# Patient Record
Sex: MALE | Race: OTHER | HISPANIC OR LATINO | URBAN - METROPOLITAN AREA
[De-identification: names, ages, dates, MRNs, and addresses within clinical notes are randomized per-mention and may not be internally consistent; named-entity substitution may affect disease eponyms.]

---

## 2017-02-02 ENCOUNTER — OUTPATIENT (OUTPATIENT)
Dept: OUTPATIENT SERVICES | Facility: HOSPITAL | Age: 47
LOS: 1 days | End: 2017-02-02
Payer: COMMERCIAL

## 2017-02-02 VITALS
WEIGHT: 190.04 LBS | DIASTOLIC BLOOD PRESSURE: 70 MMHG | TEMPERATURE: 98 F | HEART RATE: 70 BPM | HEIGHT: 68 IN | SYSTOLIC BLOOD PRESSURE: 110 MMHG

## 2017-02-02 DIAGNOSIS — M25.512 PAIN IN LEFT SHOULDER: ICD-10-CM

## 2017-02-02 DIAGNOSIS — Z01.818 ENCOUNTER FOR OTHER PREPROCEDURAL EXAMINATION: ICD-10-CM

## 2017-02-02 DIAGNOSIS — M75.100 UNSPECIFIED ROTATOR CUFF TEAR OR RUPTURE OF UNSPECIFIED SHOULDER, NOT SPECIFIED AS TRAUMATIC: ICD-10-CM

## 2017-02-02 LAB
ANION GAP SERPL CALC-SCNC: 5 MMOL/L — SIGNIFICANT CHANGE UP (ref 5–17)
BUN SERPL-MCNC: 15 MG/DL — SIGNIFICANT CHANGE UP (ref 7–23)
CALCIUM SERPL-MCNC: 9 MG/DL — SIGNIFICANT CHANGE UP (ref 8.4–10.5)
CHLORIDE SERPL-SCNC: 101 MMOL/L — SIGNIFICANT CHANGE UP (ref 96–108)
CO2 SERPL-SCNC: 30 MMOL/L — SIGNIFICANT CHANGE UP (ref 22–31)
CREAT SERPL-MCNC: 1.3 MG/DL — SIGNIFICANT CHANGE UP (ref 0.5–1.3)
GLUCOSE SERPL-MCNC: 107 MG/DL — HIGH (ref 70–99)
HCT VFR BLD CALC: 44.9 % — SIGNIFICANT CHANGE UP (ref 39–50)
HGB BLD-MCNC: 14.8 G/DL — SIGNIFICANT CHANGE UP (ref 13–17)
MCHC RBC-ENTMCNC: 28.4 PG — SIGNIFICANT CHANGE UP (ref 27–34)
MCHC RBC-ENTMCNC: 32.9 GM/DL — SIGNIFICANT CHANGE UP (ref 32–36)
MCV RBC AUTO: 86.1 FL — SIGNIFICANT CHANGE UP (ref 80–100)
PLATELET # BLD AUTO: 172 K/UL — SIGNIFICANT CHANGE UP (ref 150–400)
POTASSIUM SERPL-MCNC: 4.5 MMOL/L — SIGNIFICANT CHANGE UP (ref 3.5–5.3)
POTASSIUM SERPL-SCNC: 4.5 MMOL/L — SIGNIFICANT CHANGE UP (ref 3.5–5.3)
RBC # BLD: 5.21 M/UL — SIGNIFICANT CHANGE UP (ref 4.2–5.8)
RBC # FLD: 11.7 % — SIGNIFICANT CHANGE UP (ref 10.3–14.5)
SODIUM SERPL-SCNC: 136 MMOL/L — SIGNIFICANT CHANGE UP (ref 135–145)
WBC # BLD: 4.9 K/UL — SIGNIFICANT CHANGE UP (ref 3.8–10.5)
WBC # FLD AUTO: 4.9 K/UL — SIGNIFICANT CHANGE UP (ref 3.8–10.5)

## 2017-02-02 PROCEDURE — 93010 ELECTROCARDIOGRAM REPORT: CPT

## 2017-02-02 NOTE — H&P PST ADULT - PMH
Alcoholic cardiomyopathy    Congestive heart failure  2011 s/p ICU admission x 16 days  Depression    ETOH abuse  sober since 2011  HTN (hypertension) Congestive heart failure  2011 s/p ICU admission x 16 days  Depression    ETOH abuse  sober since 2011  HTN (hypertension) Cardiomyopathy, nonischemic    Depression    ETOH abuse  sober since 2011  HTN (hypertension)

## 2017-02-02 NOTE — H&P PST ADULT - CARDIOVASCULAR COMMENTS
Htn, alcoholic cardiomyopathy Htn, CHF HTN , non ischemic cardiomyopathy  EF 2016 40 % . Repeat echo scheduled , pending

## 2017-02-02 NOTE — H&P PST ADULT - NSANTHOSAYNRD_GEN_A_CORE
No. REMEDIOS screening performed.  STOP BANG Legend: 0-2 = LOW Risk; 3-4 = INTERMEDIATE Risk; 5-8 = HIGH Risk
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
## 2017-02-02 NOTE — H&P PST ADULT - PROBLEM SELECTOR PLAN 1
Left shoulder arthroscopy   Medical clearance   Pre op instruction Left shoulder arthroscopy   Medical clearance   Will obtain stress and echo report from cardiologist   Pre op instruction Left shoulder arthroscopy   Medical clearance   Will obtain  recent echo report from cardiologist   Pre op instruction

## 2017-02-17 RX ORDER — CEFAZOLIN SODIUM 1 G
2000 VIAL (EA) INJECTION ONCE
Qty: 0 | Refills: 0 | Status: COMPLETED | OUTPATIENT
Start: 2017-02-28 | End: 2017-02-28

## 2017-02-17 NOTE — ASU DISCHARGE PLAN (ADULT/PEDIATRIC). - MEDICATION SUMMARY - MEDICATIONS TO TAKE
I will START or STAY ON the medications listed below when I get home from the hospital:    acetaminophen-oxyCODONE 325 mg-10 mg oral tablet  -- 1 tab(s) by mouth every 4 hours, As Needed MDD:6  -- Caution federal law prohibits the transfer of this drug to any person other  than the person for whom it was prescribed.  May cause drowsiness.  Alcohol may intensify this effect.  Use care when operating dangerous machinery.  This prescription cannot be refilled.  This product contains acetaminophen.  Do not use  with any other product containing acetaminophen to prevent possible liver damage.  Using more of this medication than prescribed may cause serious breathing problems.    -- Indication: For moderate pain    enalapril 10 mg oral tablet  -- 1 tab(s) by mouth once a day  -- Indication: For Blood pressure    mirtazapine 45 mg oral tablet  -- 1 tab(s) by mouth once a day (at bedtime)  -- Indication: For mood    busPIRone 15 mg oral tablet  --  by mouth 4 times a day  -- Indication: For mood    carvedilol 25 mg oral tablet  -- 1 tab(s) by mouth 2 times a day  -- Indication: For Blood pressure    baclofen 20 mg oral tablet  --  by mouth once a day  -- Indication: For muscle relaxer

## 2017-02-17 NOTE — ASU DISCHARGE PLAN (ADULT/PEDIATRIC). - NOTIFY
Numbness, tingling/Excessive Diarrhea/Inability to Tolerate Liquids or Foods/Numbness, color, or temperature change to extremity/Persistent Nausea and Vomiting/Fever greater than 101/Unable to Urinate/Pain not relieved by Medications/Increased Irritability or Sluggishness/Swelling that continues/Bleeding that does not stop

## 2017-02-17 NOTE — ASU DISCHARGE PLAN (ADULT/PEDIATRIC). - FOLLOWUP APPOINTMENT CLINIC/PHYSICIAN
Please call Dr. TRAE Crump's office ([phone]) to schedule a follow-up appointment to be seen in 1 week.

## 2017-02-17 NOTE — ASU DISCHARGE PLAN (ADULT/PEDIATRIC). - ACTIVITY LEVEL
no heavy lifting/no exercise/no sports/gym/elevate extremity no tub baths/no heavy lifting/no exercise/elevate extremity/no sports/gym

## 2017-02-17 NOTE — ASU DISCHARGE PLAN (ADULT/PEDIATRIC). - INSTRUCTIONS
REST! Apply ice packs to incision sites 20 mins on, 20 mins off every 4 hours for first 24 hours.  If taking narcotic pain medications, may take COLACE (OTC stool softener) as directed to prevent constipation.

## 2017-02-27 ENCOUNTER — RESULT REVIEW (OUTPATIENT)
Age: 47
End: 2017-02-27

## 2017-02-28 ENCOUNTER — TRANSCRIPTION ENCOUNTER (OUTPATIENT)
Age: 47
End: 2017-02-28

## 2017-02-28 ENCOUNTER — OUTPATIENT (OUTPATIENT)
Dept: OUTPATIENT SERVICES | Facility: HOSPITAL | Age: 47
LOS: 1 days | End: 2017-02-28
Payer: COMMERCIAL

## 2017-02-28 VITALS
HEIGHT: 68 IN | RESPIRATION RATE: 12 BRPM | SYSTOLIC BLOOD PRESSURE: 110 MMHG | TEMPERATURE: 98 F | WEIGHT: 188.72 LBS | HEART RATE: 67 BPM | OXYGEN SATURATION: 98 % | DIASTOLIC BLOOD PRESSURE: 66 MMHG

## 2017-02-28 VITALS
RESPIRATION RATE: 15 BRPM | DIASTOLIC BLOOD PRESSURE: 76 MMHG | HEART RATE: 74 BPM | SYSTOLIC BLOOD PRESSURE: 110 MMHG | OXYGEN SATURATION: 95 %

## 2017-02-28 DIAGNOSIS — M75.100 UNSPECIFIED ROTATOR CUFF TEAR OR RUPTURE OF UNSPECIFIED SHOULDER, NOT SPECIFIED AS TRAUMATIC: ICD-10-CM

## 2017-02-28 DIAGNOSIS — L72.9 FOLLICULAR CYST OF THE SKIN AND SUBCUTANEOUS TISSUE, UNSPECIFIED: Chronic | ICD-10-CM

## 2017-02-28 PROCEDURE — 88304 TISSUE EXAM BY PATHOLOGIST: CPT | Mod: 26

## 2017-02-28 RX ORDER — SODIUM CHLORIDE 9 MG/ML
1000 INJECTION, SOLUTION INTRAVENOUS
Qty: 0 | Refills: 0 | Status: DISCONTINUED | OUTPATIENT
Start: 2017-02-28 | End: 2017-02-28

## 2017-02-28 RX ORDER — HYDROMORPHONE HYDROCHLORIDE 2 MG/ML
0.5 INJECTION INTRAMUSCULAR; INTRAVENOUS; SUBCUTANEOUS
Qty: 0 | Refills: 0 | Status: DISCONTINUED | OUTPATIENT
Start: 2017-02-28 | End: 2017-02-28

## 2017-02-28 RX ADMIN — SODIUM CHLORIDE 75 MILLILITER(S): 9 INJECTION, SOLUTION INTRAVENOUS at 11:15

## 2017-05-17 ENCOUNTER — APPOINTMENT (OUTPATIENT)
Dept: GASTROENTEROLOGY | Facility: CLINIC | Age: 47
End: 2017-05-17

## 2017-05-17 VITALS
WEIGHT: 185 LBS | OXYGEN SATURATION: 98 % | RESPIRATION RATE: 16 BRPM | HEART RATE: 73 BPM | HEIGHT: 68 IN | BODY MASS INDEX: 28.04 KG/M2 | SYSTOLIC BLOOD PRESSURE: 121 MMHG | DIASTOLIC BLOOD PRESSURE: 82 MMHG

## 2017-05-17 DIAGNOSIS — Z86.59 PERSONAL HISTORY OF OTHER MENTAL AND BEHAVIORAL DISORDERS: ICD-10-CM

## 2017-05-17 DIAGNOSIS — M75.120 COMPLETE ROTATOR CUFF TEAR OR RUPTURE OF UNSPECIFIED SHOULDER, NOT SPECIFIED AS TRAUMATIC: ICD-10-CM

## 2017-05-17 DIAGNOSIS — Z86.79 PERSONAL HISTORY OF OTHER DISEASES OF THE CIRCULATORY SYSTEM: ICD-10-CM

## 2017-05-17 DIAGNOSIS — R93.2 ABNORMAL FINDINGS ON DIAGNOSTIC IMAGING OF LIVER AND BILIARY TRACT: ICD-10-CM

## 2017-05-17 DIAGNOSIS — R10.11 RIGHT UPPER QUADRANT PAIN: ICD-10-CM

## 2018-01-08 PROBLEM — Z00.00 ENCOUNTER FOR PREVENTIVE HEALTH EXAMINATION: Noted: 2018-01-08

## 2018-01-10 ENCOUNTER — RECORD ABSTRACTING (OUTPATIENT)
Age: 48
End: 2018-01-10

## 2018-01-10 DIAGNOSIS — B19.10 UNSPECIFIED VIRAL HEPATITIS B W/OUT HEPATIC COMA: ICD-10-CM

## 2018-01-10 DIAGNOSIS — K76.89 OTHER SPECIFIED DISEASES OF LIVER: ICD-10-CM

## 2018-01-10 DIAGNOSIS — Z78.9 OTHER SPECIFIED HEALTH STATUS: ICD-10-CM

## 2018-01-10 DIAGNOSIS — K21.9 GASTRO-ESOPHAGEAL REFLUX DISEASE W/OUT ESOPHAGITIS: ICD-10-CM

## 2018-01-10 DIAGNOSIS — F41.9 ANXIETY DISORDER, UNSPECIFIED: ICD-10-CM

## 2018-02-07 ENCOUNTER — APPOINTMENT (OUTPATIENT)
Dept: CARDIOLOGY | Facility: CLINIC | Age: 48
End: 2018-02-07
Payer: COMMERCIAL

## 2018-02-07 VITALS
RESPIRATION RATE: 18 BRPM | WEIGHT: 186 LBS | HEART RATE: 72 BPM | DIASTOLIC BLOOD PRESSURE: 68 MMHG | HEIGHT: 68 IN | SYSTOLIC BLOOD PRESSURE: 110 MMHG | BODY MASS INDEX: 28.19 KG/M2

## 2018-02-07 DIAGNOSIS — F32.9 MAJOR DEPRESSIVE DISORDER, SINGLE EPISODE, UNSPECIFIED: ICD-10-CM

## 2018-02-07 PROCEDURE — 99214 OFFICE O/P EST MOD 30 MIN: CPT

## 2018-02-07 PROCEDURE — 93000 ELECTROCARDIOGRAM COMPLETE: CPT

## 2018-02-07 RX ORDER — CYCLOBENZAPRINE HYDROCHLORIDE 7.5 MG/1
TABLET, FILM COATED ORAL
Refills: 0 | Status: DISCONTINUED | COMMUNITY
End: 2018-02-07

## 2018-03-30 RX ORDER — BACLOFEN 100 %
0 POWDER (GRAM) MISCELLANEOUS
Qty: 0 | Refills: 0 | COMMUNITY

## 2018-03-30 RX ORDER — CARVEDILOL PHOSPHATE 80 MG/1
1 CAPSULE, EXTENDED RELEASE ORAL
Qty: 0 | Refills: 0 | COMMUNITY

## 2018-03-30 RX ORDER — MIRTAZAPINE 45 MG/1
1 TABLET, ORALLY DISINTEGRATING ORAL
Qty: 0 | Refills: 0 | COMMUNITY

## 2018-05-07 ENCOUNTER — APPOINTMENT (OUTPATIENT)
Dept: CARDIOLOGY | Facility: CLINIC | Age: 48
End: 2018-05-07
Payer: COMMERCIAL

## 2018-05-07 PROCEDURE — 93306 TTE W/DOPPLER COMPLETE: CPT

## 2018-05-15 ENCOUNTER — APPOINTMENT (OUTPATIENT)
Dept: CARDIOLOGY | Facility: CLINIC | Age: 48
End: 2018-05-15
Payer: MEDICAID

## 2018-05-15 VITALS
BODY MASS INDEX: 28.19 KG/M2 | WEIGHT: 186 LBS | HEIGHT: 68 IN | SYSTOLIC BLOOD PRESSURE: 113 MMHG | DIASTOLIC BLOOD PRESSURE: 80 MMHG | HEART RATE: 70 BPM | RESPIRATION RATE: 16 BRPM

## 2018-05-15 DIAGNOSIS — Z78.9 OTHER SPECIFIED HEALTH STATUS: ICD-10-CM

## 2018-05-15 PROCEDURE — 99214 OFFICE O/P EST MOD 30 MIN: CPT

## 2018-05-15 RX ORDER — BUSPIRONE HCL 5 MG
TABLET ORAL
Refills: 0 | Status: DISCONTINUED | COMMUNITY
End: 2018-05-15

## 2018-05-15 RX ORDER — ENALAPRIL MALEATE 10 MG/1
10 TABLET ORAL
Refills: 0 | Status: DISCONTINUED | COMMUNITY
End: 2018-05-15

## 2018-05-15 RX ORDER — MIRTAZAPINE 7.5 MG/1
TABLET, FILM COATED ORAL
Refills: 0 | Status: DISCONTINUED | COMMUNITY
End: 2018-05-15

## 2018-05-15 RX ORDER — BUPROPION HYDROCHLORIDE 75 MG/1
TABLET, FILM COATED ORAL
Refills: 0 | Status: DISCONTINUED | COMMUNITY
End: 2018-05-15

## 2018-05-15 RX ORDER — CARVEDILOL 25 MG/1
25 TABLET, FILM COATED ORAL
Refills: 0 | Status: DISCONTINUED | COMMUNITY
End: 2018-05-15

## 2018-07-16 PROBLEM — F10.10 ALCOHOL ABUSE, UNCOMPLICATED: Chronic | Status: ACTIVE | Noted: 2017-02-02

## 2018-10-29 ENCOUNTER — APPOINTMENT (OUTPATIENT)
Dept: CARDIOLOGY | Facility: CLINIC | Age: 48
End: 2018-10-29
Payer: MEDICAID

## 2018-10-29 VITALS
OXYGEN SATURATION: 99 % | HEIGHT: 68 IN | RESPIRATION RATE: 15 BRPM | WEIGHT: 181 LBS | DIASTOLIC BLOOD PRESSURE: 79 MMHG | SYSTOLIC BLOOD PRESSURE: 116 MMHG | HEART RATE: 66 BPM | BODY MASS INDEX: 27.43 KG/M2

## 2018-10-29 PROBLEM — I42.8 OTHER CARDIOMYOPATHIES: Chronic | Status: ACTIVE | Noted: 2017-02-24

## 2018-10-29 PROBLEM — F32.9 MAJOR DEPRESSIVE DISORDER, SINGLE EPISODE, UNSPECIFIED: Chronic | Status: ACTIVE | Noted: 2017-02-02

## 2018-10-29 PROBLEM — I10 ESSENTIAL (PRIMARY) HYPERTENSION: Chronic | Status: ACTIVE | Noted: 2017-02-02

## 2018-10-29 PROCEDURE — 99214 OFFICE O/P EST MOD 30 MIN: CPT

## 2018-10-29 PROCEDURE — 93000 ELECTROCARDIOGRAM COMPLETE: CPT

## 2018-10-29 RX ORDER — BACLOFEN 20 MG/1
20 TABLET ORAL
Refills: 0 | Status: DISCONTINUED | COMMUNITY
End: 2018-10-29

## 2018-10-29 RX ORDER — MELOXICAM 15 MG/1
15 TABLET ORAL
Refills: 0 | Status: DISCONTINUED | COMMUNITY
End: 2018-10-29

## 2018-10-29 RX ORDER — CYCLOBENZAPRINE HYDROCHLORIDE 10 MG/1
10 TABLET, FILM COATED ORAL
Refills: 0 | Status: DISCONTINUED | COMMUNITY
End: 2018-10-29

## 2019-02-18 ENCOUNTER — APPOINTMENT (OUTPATIENT)
Dept: CARDIOLOGY | Facility: CLINIC | Age: 49
End: 2019-02-18
Payer: MEDICAID

## 2019-02-18 PROCEDURE — 93306 TTE W/DOPPLER COMPLETE: CPT

## 2019-03-06 ENCOUNTER — APPOINTMENT (OUTPATIENT)
Dept: CARDIOLOGY | Facility: CLINIC | Age: 49
End: 2019-03-06
Payer: MEDICAID

## 2019-03-06 ENCOUNTER — NON-APPOINTMENT (OUTPATIENT)
Age: 49
End: 2019-03-06

## 2019-03-06 VITALS
WEIGHT: 188 LBS | OXYGEN SATURATION: 98 % | HEIGHT: 68 IN | BODY MASS INDEX: 28.49 KG/M2 | SYSTOLIC BLOOD PRESSURE: 111 MMHG | RESPIRATION RATE: 14 BRPM | HEART RATE: 70 BPM | DIASTOLIC BLOOD PRESSURE: 75 MMHG

## 2019-03-06 PROCEDURE — 99214 OFFICE O/P EST MOD 30 MIN: CPT

## 2019-03-06 PROCEDURE — 93000 ELECTROCARDIOGRAM COMPLETE: CPT

## 2019-03-06 NOTE — PHYSICAL EXAM
[General Appearance - Well Developed] : well developed [Normal Appearance] : normal appearance [Well Groomed] : well groomed [General Appearance - Well Nourished] : well nourished [No Deformities] : no deformities [General Appearance - In No Acute Distress] : no acute distress [Normal Conjunctiva] : the conjunctiva exhibited no abnormalities [Eyelids - No Xanthelasma] : the eyelids demonstrated no xanthelasmas [Normal Oral Mucosa] : normal oral mucosa [No Oral Pallor] : no oral pallor [No Oral Cyanosis] : no oral cyanosis [Normal Jugular Venous A Waves Present] : normal jugular venous A waves present [Normal Jugular Venous V Waves Present] : normal jugular venous V waves present [No Jugular Venous Flynn A Waves] : no jugular venous flynn A waves [Auscultation Breath Sounds / Voice Sounds] : lungs were clear to auscultation bilaterally [Abdomen Soft] : soft [Abdomen Tenderness] : non-tender [Abdomen Mass (___ Cm)] : no abdominal mass palpated [Abnormal Walk] : normal gait [Nail Clubbing] : no clubbing of the fingernails [Cyanosis, Localized] : no localized cyanosis [Petechial Hemorrhages (___cm)] : no petechial hemorrhages [] : no ischemic changes [Skin Color & Pigmentation] : normal skin color and pigmentation [Oriented To Time, Place, And Person] : oriented to person, place, and time [Affect] : the affect was normal [FreeTextEntry1] : S1S2, RRR, no M/R/G, no carotid bruits

## 2019-03-06 NOTE — HISTORY OF PRESENT ILLNESS
[FreeTextEntry1] : Patient is a 46yo M here for follow up of his NICM. Denies CP/SOB. No pnd/orthopnea/palps/syncope. Still gets numbness in arms at night when sleeps on his sides. Still occasional chest discomfort that hasn't changed, still pleuritic. Has been going on many years without change. No other new complaints at this time. \par \par ROS: GI and  negative\par

## 2019-03-06 NOTE — ASSESSMENT
[FreeTextEntry1] : ECG: SR, low voltage \par \par ECHO 1/2019:\par 1. Normal left ventricular internal cavity size.\par 2. Mildly decreased global left ventricular systolic function.\par 3. Left ventricular ejection fraction, by visual estimation, is 40 to 45%.\par 4. Impaired relaxation pattern of LV diastolic filling.\par 5. Mildly reduced RV systolic function.\par 6. The left atrium is normal in size and structure.\par 7. The right atrium is normal in size and structure.\par 8. Normal aortic valve, without any evidence of aortic stenosis or insufficiency.\par 9. Structurally normal mitral valve. No evidence of mitral stenosis or significant regurgitation.\par 10. Mild tricuspid regurgitation.\par 11. Intra-atrial septum is aneurysmal without evidence of intra-atrial shunting.\par 12. There is no evidence of pericardial effusion.\par 13. Recommend clinical correlation with the above findings.\par \par ECHO 5/2018:\par 1. Normal left ventricular internal cavity size.\par 2. Mildly decreased global left ventricular systolic function.\par 3. Left ventricular ejection fraction, by visual estimation, is 45 to 50%.\par 4. Impaired relaxation pattern of LV diastolic filling.\par 5. Normal right ventricular size and systolic function.\par 6. The left atrium is normal in size and structure.\par 7. Mildly dilated right atrium.\par 8. Normal aortic valve, without any evidence of aortic stenosis or insufficiency.\par 9. Mild thickening of the anterior and posterior mitral valve leaflets.\par 10. Mild tricuspid regurgitation.\par 11. Interatrial and interventricular septa appear intact.\par 12. A false tendon is seen in the LV apical cavity.\par 13. In comparison to prior studies there is no significant interval change.\par 14. Recommend clinical correlation with the above findings.\par \par CTA 2013: Calcium score = 0\par \par

## 2019-03-06 NOTE — DISCUSSION/SUMMARY
[FreeTextEntry1] : ASSESSMENT: Patient is a 47 white male here for followup of his nonischemic cardiomyopathy. No signs of symptoms of CHF, exam unchanged. LV function minimally changed compared to last year, now 40-45%. \par \par 1. Continue medical management of NICM with coreg and enalapril\par 2. Recommend aggressive diet and lifestyle modifications for dyslipidemia\par 3. Recommend 30 minutes aerobic activity 4 to 5 days per week \par 4. Follow up 6 months\par 5. REMEDIOS management per pulmonary

## 2019-04-11 ENCOUNTER — APPOINTMENT (OUTPATIENT)
Dept: CARDIOLOGY | Facility: CLINIC | Age: 49
End: 2019-04-11

## 2019-08-08 ENCOUNTER — RX RENEWAL (OUTPATIENT)
Age: 49
End: 2019-08-08

## 2019-08-12 ENCOUNTER — NON-APPOINTMENT (OUTPATIENT)
Age: 49
End: 2019-08-12

## 2019-08-12 ENCOUNTER — APPOINTMENT (OUTPATIENT)
Dept: CARDIOLOGY | Facility: CLINIC | Age: 49
End: 2019-08-12
Payer: MEDICAID

## 2019-08-12 VITALS
SYSTOLIC BLOOD PRESSURE: 125 MMHG | RESPIRATION RATE: 15 BRPM | BODY MASS INDEX: 26.22 KG/M2 | HEIGHT: 68 IN | WEIGHT: 173 LBS | HEART RATE: 91 BPM | DIASTOLIC BLOOD PRESSURE: 82 MMHG | OXYGEN SATURATION: 96 %

## 2019-08-12 PROCEDURE — 93000 ELECTROCARDIOGRAM COMPLETE: CPT

## 2019-08-12 PROCEDURE — 99214 OFFICE O/P EST MOD 30 MIN: CPT

## 2019-08-12 NOTE — ASSESSMENT
[FreeTextEntry1] : ECG: SR, low voltage (unchanged) \par \par ECHO 1/2019:\par 1. Normal left ventricular internal cavity size.\par 2. Mildly decreased global left ventricular systolic function.\par 3. Left ventricular ejection fraction, by visual estimation, is 40 to 45%.\par 4. Impaired relaxation pattern of LV diastolic filling.\par 5. Mildly reduced RV systolic function.\par 6. The left atrium is normal in size and structure.\par 7. The right atrium is normal in size and structure.\par 8. Normal aortic valve, without any evidence of aortic stenosis or insufficiency.\par 9. Structurally normal mitral valve. No evidence of mitral stenosis or significant regurgitation.\par 10. Mild tricuspid regurgitation.\par 11. Intra-atrial septum is aneurysmal without evidence of intra-atrial shunting.\par 12. There is no evidence of pericardial effusion.\par 13. Recommend clinical correlation with the above findings.\par \par ECHO 5/2018:\par 1. Normal left ventricular internal cavity size.\par 2. Mildly decreased global left ventricular systolic function.\par 3. Left ventricular ejection fraction, by visual estimation, is 45 to 50%.\par 4. Impaired relaxation pattern of LV diastolic filling.\par 5. Normal right ventricular size and systolic function.\par 6. The left atrium is normal in size and structure.\par 7. Mildly dilated right atrium.\par 8. Normal aortic valve, without any evidence of aortic stenosis or insufficiency.\par 9. Mild thickening of the anterior and posterior mitral valve leaflets.\par 10. Mild tricuspid regurgitation.\par 11. Interatrial and interventricular septa appear intact.\par 12. A false tendon is seen in the LV apical cavity.\par 13. In comparison to prior studies there is no significant interval change.\par 14. Recommend clinical correlation with the above findings.\par \par CTA 2013: Calcium score = 0\par \par

## 2019-08-12 NOTE — PHYSICAL EXAM
[General Appearance - Well Developed] : well developed [General Appearance - Well Nourished] : well nourished [General Appearance - In No Acute Distress] : no acute distress [Normal Conjunctiva] : the conjunctiva exhibited no abnormalities [Normal Oral Mucosa] : normal oral mucosa [Normal Jugular Venous V Waves Present] : normal jugular venous V waves present [] : no respiratory distress [Respiration, Rhythm And Depth] : normal respiratory rhythm and effort [Auscultation Breath Sounds / Voice Sounds] : lungs were clear to auscultation bilaterally [Heart Rate And Rhythm] : heart rate and rhythm were normal [Heart Sounds] : normal S1 and S2 [Murmurs] : no murmurs present [Bowel Sounds] : normal bowel sounds [Abdomen Soft] : soft [Abdomen Tenderness] : non-tender [Abdomen Mass (___ Cm)] : no abdominal mass palpated [Nail Clubbing] : no clubbing of the fingernails [Abnormal Walk] : normal gait [Cyanosis, Localized] : no localized cyanosis [Skin Color & Pigmentation] : normal skin color and pigmentation [Oriented To Time, Place, And Person] : oriented to person, place, and time [FreeTextEntry1] : flat affect

## 2019-08-12 NOTE — HISTORY OF PRESENT ILLNESS
[FreeTextEntry1] : Patient is a 47yo M here for follow up of his NICM. Denies CP/SOB. No pnd/orthopnea/palps/syncope. Still gets numbness in arms at night when sleeps on his sides. Still occasional chest discomfort that hasn't changed, still pleuritic. Has been going on many years without change. No other new complaints at this time. Remains chronically tired/fatigues as well. Tends to lose weight in summers as appetite less. \par \par ROS: GI and  negative\par

## 2020-01-14 ENCOUNTER — APPOINTMENT (OUTPATIENT)
Dept: CARDIOLOGY | Facility: CLINIC | Age: 50
End: 2020-01-14
Payer: MEDICAID

## 2020-01-14 PROCEDURE — 93306 TTE W/DOPPLER COMPLETE: CPT

## 2020-01-24 ENCOUNTER — APPOINTMENT (OUTPATIENT)
Dept: CARDIOLOGY | Facility: CLINIC | Age: 50
End: 2020-01-24
Payer: MEDICAID

## 2020-01-24 ENCOUNTER — NON-APPOINTMENT (OUTPATIENT)
Age: 50
End: 2020-01-24

## 2020-01-24 VITALS
DIASTOLIC BLOOD PRESSURE: 75 MMHG | HEART RATE: 74 BPM | BODY MASS INDEX: 28.49 KG/M2 | OXYGEN SATURATION: 98 % | HEIGHT: 68 IN | WEIGHT: 188 LBS | SYSTOLIC BLOOD PRESSURE: 114 MMHG | RESPIRATION RATE: 16 BRPM

## 2020-01-24 PROCEDURE — 99214 OFFICE O/P EST MOD 30 MIN: CPT

## 2020-01-24 PROCEDURE — 93000 ELECTROCARDIOGRAM COMPLETE: CPT

## 2020-01-24 RX ORDER — ENALAPRIL MALEATE 10 MG/1
10 TABLET ORAL TWICE DAILY
Qty: 180 | Refills: 2 | Status: DISCONTINUED | COMMUNITY
Start: 1900-01-01 | End: 2020-01-24

## 2020-01-24 NOTE — HISTORY OF PRESENT ILLNESS
[FreeTextEntry1] : Patient is a 47yo M here for follow up of his NICM. Denies CP/SOB. No pnd/orthopnea/palps/syncope. Still gets numbness in arms at night when sleeps on his sides. Still occasional chest discomfort that hasn't changed, still pleuritic. Has been going on many years without change. No other new complaints at this time. Remains chronically tired/fatigues as well. No new symptoms. NO changes at this time. GEts winded going up stairs. \par \par ROS: GI and  negative\par

## 2020-01-24 NOTE — ASSESSMENT
[FreeTextEntry1] : ECG: SR, low voltage (unchanged) \par \par ECHO 1/2020:\par 1. Mild LV dysfunction, EF 40-45%\par 2. Grade I diastolic dysfunction\par 3. Normal RV/LA/RA\par 4. No clinically significant valvular disease \par \par ECHO 1/2019:\par 1. Normal left ventricular internal cavity size.\par 2. Mildly decreased global left ventricular systolic function.\par 3. Left ventricular ejection fraction, by visual estimation, is 40 to 45%.\par 4. Impaired relaxation pattern of LV diastolic filling.\par 5. Mildly reduced RV systolic function.\par 6. The left atrium is normal in size and structure.\par 7. The right atrium is normal in size and structure.\par 8. Normal aortic valve, without any evidence of aortic stenosis or insufficiency.\par 9. Structurally normal mitral valve. No evidence of mitral stenosis or significant regurgitation.\par 10. Mild tricuspid regurgitation.\par 11. Intra-atrial septum is aneurysmal without evidence of intra-atrial shunting.\par 12. There is no evidence of pericardial effusion.\par 13. Recommend clinical correlation with the above findings.\par \par ECHO 5/2018:\par 1. Normal left ventricular internal cavity size.\par 2. Mildly decreased global left ventricular systolic function.\par 3. Left ventricular ejection fraction, by visual estimation, is 45 to 50%.\par 4. Impaired relaxation pattern of LV diastolic filling.\par 5. Normal right ventricular size and systolic function.\par 6. The left atrium is normal in size and structure.\par 7. Mildly dilated right atrium.\par 8. Normal aortic valve, without any evidence of aortic stenosis or insufficiency.\par 9. Mild thickening of the anterior and posterior mitral valve leaflets.\par 10. Mild tricuspid regurgitation.\par 11. Interatrial and interventricular septa appear intact.\par 12. A false tendon is seen in the LV apical cavity.\par 13. In comparison to prior studies there is no significant interval change.\par 14. Recommend clinical correlation with the above findings.\par \par CTA 2013: Calcium score = 0\par \par

## 2020-01-24 NOTE — DISCUSSION/SUMMARY
[FreeTextEntry1] : Patient is a 49yo M with NICM, REMEDIOS here for cardiac follow up. Still with chronic fatigue, may have some increased dyspnea on exertion. LV function has now progressed more as well with drop in EF. Will ensure no ischemic heart disease, last evaluation was in 2013. Also change ACEI to entresto.  \par \par 1. DC enalapril, start entresto in 36 hours 49/51mg po bid\par 2. Contineu coreg\par 3. Nuclear stress test to evaluate ischemia as cause of LV dysfunction progression\par 4. BW in a couple weeks, BNP/Mg and BNP\par 5. Follow up 1 month\par 6. Repeat echo once on max tolerated dose entresto

## 2020-01-24 NOTE — PHYSICAL EXAM
[General Appearance - Well Developed] : well developed [General Appearance - In No Acute Distress] : no acute distress [Normal Conjunctiva] : the conjunctiva exhibited no abnormalities [General Appearance - Well Nourished] : well nourished [Normal Jugular Venous V Waves Present] : normal jugular venous V waves present [Normal Oral Mucosa] : normal oral mucosa [Respiration, Rhythm And Depth] : normal respiratory rhythm and effort [Auscultation Breath Sounds / Voice Sounds] : lungs were clear to auscultation bilaterally [] : no respiratory distress [Heart Rate And Rhythm] : heart rate and rhythm were normal [Heart Sounds] : normal S1 and S2 [Murmurs] : no murmurs present [Abdomen Soft] : soft [Bowel Sounds] : normal bowel sounds [Abdomen Tenderness] : non-tender [Abdomen Mass (___ Cm)] : no abdominal mass palpated [Abnormal Walk] : normal gait [Nail Clubbing] : no clubbing of the fingernails [Cyanosis, Localized] : no localized cyanosis [Oriented To Time, Place, And Person] : oriented to person, place, and time [Skin Color & Pigmentation] : normal skin color and pigmentation [FreeTextEntry1] : flat affect

## 2020-02-21 ENCOUNTER — APPOINTMENT (OUTPATIENT)
Dept: CARDIOLOGY | Facility: CLINIC | Age: 50
End: 2020-02-21
Payer: MEDICAID

## 2020-02-21 PROCEDURE — 93015 CV STRESS TEST SUPVJ I&R: CPT

## 2020-02-21 PROCEDURE — 78452 HT MUSCLE IMAGE SPECT MULT: CPT

## 2020-02-21 PROCEDURE — A9500: CPT

## 2020-02-25 ENCOUNTER — NON-APPOINTMENT (OUTPATIENT)
Age: 50
End: 2020-02-25

## 2020-02-25 ENCOUNTER — APPOINTMENT (OUTPATIENT)
Dept: CARDIOLOGY | Facility: CLINIC | Age: 50
End: 2020-02-25
Payer: MEDICAID

## 2020-02-25 VITALS
SYSTOLIC BLOOD PRESSURE: 107 MMHG | BODY MASS INDEX: 28.88 KG/M2 | HEIGHT: 67 IN | HEART RATE: 84 BPM | DIASTOLIC BLOOD PRESSURE: 73 MMHG | RESPIRATION RATE: 16 BRPM | WEIGHT: 184 LBS

## 2020-02-25 PROCEDURE — 99214 OFFICE O/P EST MOD 30 MIN: CPT

## 2020-02-25 PROCEDURE — 93000 ELECTROCARDIOGRAM COMPLETE: CPT

## 2020-02-25 NOTE — HISTORY OF PRESENT ILLNESS
[FreeTextEntry1] : Patient is a 49yo M here for follow up of his NICM.. No pnd/orthopnea/palps/syncope. Underwent nuclear stress testing after last visit. Had CP with exertion on treadmill. States has  been noting consistent CP with exertion recently. Patient denies PND/orthopnea/edema/palpitations/syncope/claudication. CTA ordered and started on nitrates. Also put on entresto at last visit. Since starting isosorbide states chest pain is better. \par \par ROS: GI and  negative\par

## 2020-02-25 NOTE — PHYSICAL EXAM
[General Appearance - Well Nourished] : well nourished [General Appearance - Well Developed] : well developed [Normal Conjunctiva] : the conjunctiva exhibited no abnormalities [General Appearance - In No Acute Distress] : no acute distress [Normal Jugular Venous V Waves Present] : normal jugular venous V waves present [] : no respiratory distress [Normal Oral Mucosa] : normal oral mucosa [Auscultation Breath Sounds / Voice Sounds] : lungs were clear to auscultation bilaterally [Heart Rate And Rhythm] : heart rate and rhythm were normal [Respiration, Rhythm And Depth] : normal respiratory rhythm and effort [Bowel Sounds] : normal bowel sounds [Heart Sounds] : normal S1 and S2 [Murmurs] : no murmurs present [Abdomen Mass (___ Cm)] : no abdominal mass palpated [Abdomen Soft] : soft [Abdomen Tenderness] : non-tender [Abnormal Walk] : normal gait [Cyanosis, Localized] : no localized cyanosis [Nail Clubbing] : no clubbing of the fingernails [Skin Color & Pigmentation] : normal skin color and pigmentation [Oriented To Time, Place, And Person] : oriented to person, place, and time [FreeTextEntry1] : no edema

## 2020-02-25 NOTE — DISCUSSION/SUMMARY
[FreeTextEntry1] : Patient is a 47yo M with NICM, REMEDIOS here for cardiac follow up. Still with chronic fatigue, may have some increased dyspnea on exertion. LV function has now progressed more as well with drop in EF, having exertional CP however nuclear stress test without ischemia and EF normal with stress. CP has improved some with nitrates, ? small vessel disease or related to CMP. CTA still pending. Will continue current medication regiment\par \par 1. Continue medical management of CMP, tolerating meds well. \par 2. Continue nitrates for CP, possibly related to his CMP or small vessel disease. will arrange CTA\par 3. BP low, will not titrate up entresto/coreg at this time. Repeat echo in a few months\par 4. Follow up 2-3 months

## 2020-02-28 RX ORDER — KIT FOR THE PREPARATION OF TECHNETIUM TC99M SESTAMIBI 1 MG/5ML
INJECTION, POWDER, LYOPHILIZED, FOR SOLUTION PARENTERAL
Refills: 0 | Status: COMPLETED | OUTPATIENT
Start: 2020-02-28

## 2020-02-28 RX ADMIN — KIT FOR THE PREPARATION OF TECHNETIUM TC99M SESTAMIBI 0: 1 INJECTION, POWDER, LYOPHILIZED, FOR SOLUTION PARENTERAL at 00:00

## 2020-03-23 ENCOUNTER — RESULT REVIEW (OUTPATIENT)
Age: 50
End: 2020-03-23

## 2020-03-23 ENCOUNTER — OUTPATIENT (OUTPATIENT)
Dept: OUTPATIENT SERVICES | Facility: HOSPITAL | Age: 50
LOS: 1 days | End: 2020-03-23
Payer: COMMERCIAL

## 2020-03-23 DIAGNOSIS — R07.9 CHEST PAIN, UNSPECIFIED: ICD-10-CM

## 2020-03-23 DIAGNOSIS — L72.9 FOLLICULAR CYST OF THE SKIN AND SUBCUTANEOUS TISSUE, UNSPECIFIED: Chronic | ICD-10-CM

## 2020-03-23 PROCEDURE — 75571 CT HRT W/O DYE W/CA TEST: CPT | Mod: 26

## 2020-03-23 PROCEDURE — 75571 CT HRT W/O DYE W/CA TEST: CPT

## 2020-05-26 ENCOUNTER — APPOINTMENT (OUTPATIENT)
Dept: CARDIOLOGY | Facility: CLINIC | Age: 50
End: 2020-05-26

## 2020-09-08 NOTE — ASSESSMENT
[FreeTextEntry1] : ECG: SR, low voltage, artifact, NSST  (unchanged) \par \par EXERCISE NUCLEAR STRESS TEST 2/2020:\par 1. Negative for ischemia\par 2. EF 56%\par 3. Developed anginal type chest pain with exertion that resolved with rest\par \par ECHO 1/2020:\par 1. Mild LV dysfunction, EF 40-45%\par 2. Grade I diastolic dysfunction\par 3. Normal RV/LA/RA\par 4. No clinically significant valvular disease \par \par ECHO 1/2019:\par 1. Normal left ventricular internal cavity size.\par 2. Mildly decreased global left ventricular systolic function.\par 3. Left ventricular ejection fraction, by visual estimation, is 40 to 45%.\par 4. Impaired relaxation pattern of LV diastolic filling.\par 5. Mildly reduced RV systolic function.\par 6. The left atrium is normal in size and structure.\par 7. The right atrium is normal in size and structure.\par 8. Normal aortic valve, without any evidence of aortic stenosis or insufficiency.\par 9. Structurally normal mitral valve. No evidence of mitral stenosis or significant regurgitation.\par 10. Mild tricuspid regurgitation.\par 11. Intra-atrial septum is aneurysmal without evidence of intra-atrial shunting.\par 12. There is no evidence of pericardial effusion.\par 13. Recommend clinical correlation with the above findings.\par \par ECHO 5/2018:\par 1. Normal left ventricular internal cavity size.\par 2. Mildly decreased global left ventricular systolic function.\par 3. Left ventricular ejection fraction, by visual estimation, is 45 to 50%.\par 4. Impaired relaxation pattern of LV diastolic filling.\par 5. Normal right ventricular size and systolic function.\par 6. The left atrium is normal in size and structure.\par 7. Mildly dilated right atrium.\par 8. Normal aortic valve, without any evidence of aortic stenosis or insufficiency.\par 9. Mild thickening of the anterior and posterior mitral valve leaflets.\par 10. Mild tricuspid regurgitation.\par 11. Interatrial and interventricular septa appear intact.\par 12. A false tendon is seen in the LV apical cavity.\par 13. In comparison to prior studies there is no significant interval change.\par 14. Recommend clinical correlation with the above findings.\par \par CTA 2013: Calcium score = 0\par \par 
No

## 2020-11-24 ENCOUNTER — APPOINTMENT (OUTPATIENT)
Dept: CARDIOLOGY | Facility: CLINIC | Age: 50
End: 2020-11-24
Payer: MEDICAID

## 2020-11-24 ENCOUNTER — NON-APPOINTMENT (OUTPATIENT)
Age: 50
End: 2020-11-24

## 2020-11-24 VITALS
RESPIRATION RATE: 16 BRPM | WEIGHT: 187 LBS | SYSTOLIC BLOOD PRESSURE: 133 MMHG | HEIGHT: 67 IN | BODY MASS INDEX: 29.35 KG/M2 | HEART RATE: 67 BPM | DIASTOLIC BLOOD PRESSURE: 88 MMHG | TEMPERATURE: 98.2 F

## 2020-11-24 PROCEDURE — 99214 OFFICE O/P EST MOD 30 MIN: CPT

## 2020-11-24 PROCEDURE — 93000 ELECTROCARDIOGRAM COMPLETE: CPT

## 2020-11-24 NOTE — ASSESSMENT
[FreeTextEntry1] : ECG: SR, low voltage, artifact, NSST  (unchanged) \par \par CAC 3/2020: zero\par \par EXERCISE NUCLEAR STRESS TEST 2/2020:\par 1. Negative for ischemia\par 2. EF 56%\par 3. Developed anginal type chest pain with exertion that resolved with rest\par \par ECHO 1/2020:\par 1. Mild LV dysfunction, EF 40-45%\par 2. Grade I diastolic dysfunction\par 3. Normal RV/LA/RA\par 4. No clinically significant valvular disease \par \par ECHO 1/2019:\par 1. Normal left ventricular internal cavity size.\par 2. Mildly decreased global left ventricular systolic function.\par 3. Left ventricular ejection fraction, by visual estimation, is 40 to 45%.\par 4. Impaired relaxation pattern of LV diastolic filling.\par 5. Mildly reduced RV systolic function.\par 6. The left atrium is normal in size and structure.\par 7. The right atrium is normal in size and structure.\par 8. Normal aortic valve, without any evidence of aortic stenosis or insufficiency.\par 9. Structurally normal mitral valve. No evidence of mitral stenosis or significant regurgitation.\par 10. Mild tricuspid regurgitation.\par 11. Intra-atrial septum is aneurysmal without evidence of intra-atrial shunting.\par 12. There is no evidence of pericardial effusion.\par 13. Recommend clinical correlation with the above findings.\par \par ECHO 5/2018:\par 1. Normal left ventricular internal cavity size.\par 2. Mildly decreased global left ventricular systolic function.\par 3. Left ventricular ejection fraction, by visual estimation, is 45 to 50%.\par 4. Impaired relaxation pattern of LV diastolic filling.\par 5. Normal right ventricular size and systolic function.\par 6. The left atrium is normal in size and structure.\par 7. Mildly dilated right atrium.\par 8. Normal aortic valve, without any evidence of aortic stenosis or insufficiency.\par 9. Mild thickening of the anterior and posterior mitral valve leaflets.\par 10. Mild tricuspid regurgitation.\par 11. Interatrial and interventricular septa appear intact.\par 12. A false tendon is seen in the LV apical cavity.\par 13. In comparison to prior studies there is no significant interval change.\par 14. Recommend clinical correlation with the above findings.\par \par CTA 2013: Calcium score = 0\par \par

## 2020-11-24 NOTE — PHYSICAL EXAM
[General Appearance - Well Developed] : well developed [General Appearance - Well Nourished] : well nourished [General Appearance - In No Acute Distress] : no acute distress [Normal Conjunctiva] : the conjunctiva exhibited no abnormalities [Normal Oral Mucosa] : normal oral mucosa [Normal Jugular Venous V Waves Present] : normal jugular venous V waves present [] : no respiratory distress [Respiration, Rhythm And Depth] : normal respiratory rhythm and effort [Auscultation Breath Sounds / Voice Sounds] : lungs were clear to auscultation bilaterally [Heart Rate And Rhythm] : heart rate and rhythm were normal [Heart Sounds] : normal S1 and S2 [Murmurs] : no murmurs present [Bowel Sounds] : normal bowel sounds [Abdomen Soft] : soft [Abdomen Tenderness] : non-tender [Abdomen Mass (___ Cm)] : no abdominal mass palpated [Abnormal Walk] : normal gait [Nail Clubbing] : no clubbing of the fingernails [Cyanosis, Localized] : no localized cyanosis [Skin Color & Pigmentation] : normal skin color and pigmentation [Oriented To Time, Place, And Person] : oriented to person, place, and time [FreeTextEntry1] : flat affect

## 2020-11-24 NOTE — DISCUSSION/SUMMARY
[FreeTextEntry1] : Patient is a 51yo M with NICM, REMEDIOS here for cardiac follow up.  LV function has now progressed more as well with drop in EF, having exertional CP however nuclear stress test without ischemia and EF normal with stress. CP has improved some with nitrates, ? small vessel disease or related to CMP.  CAC = zero. \par \par 1. Continue medical management of CMP, tolerating meds well. Trial of increasing entresto to 97/103mg bid\par 2. Continue nitrates for CP, possibly related to his CMP or small vessel disease. \par 3. Chronic fatigue/dyspnea ? related to CMP or depressions. Will assess if increase in entresto helps\par 4. Follow up 2-3 months with echo to re-evaluate CMP
[FreeTextEntry1] : Type a tympanograms could not condition for full audiogram but did get responses in [place] at 20 dB

## 2020-11-24 NOTE — HISTORY OF PRESENT ILLNESS
[FreeTextEntry1] : Patient is a 51yo M here for follow up of his NICM.. No pnd/orthopnea/palps/syncope.  Patient denies PND/orthopnea/edema/palpitations/syncope/claudication.  Was having chest pain beginning of the year and improved with nitrates. Nuclear stress testing negative and CAC done and zero. Some days better than others and will still get tired/fatigued. \par \par ROS: GI and  negative\par

## 2021-01-05 ENCOUNTER — APPOINTMENT (OUTPATIENT)
Dept: CARDIOLOGY | Facility: CLINIC | Age: 51
End: 2021-01-05
Payer: MEDICAID

## 2021-01-05 PROCEDURE — 99072 ADDL SUPL MATRL&STAF TM PHE: CPT

## 2021-01-05 PROCEDURE — 93306 TTE W/DOPPLER COMPLETE: CPT

## 2021-01-05 RX ORDER — PERFLUTREN 6.52 MG/ML
6.52 INJECTION, SUSPENSION INTRAVENOUS
Qty: 2 | Refills: 0 | Status: COMPLETED | OUTPATIENT
Start: 2021-01-05

## 2021-01-05 RX ADMIN — PERFLUTREN MG/ML: 6.52 INJECTION, SUSPENSION INTRAVENOUS at 00:00

## 2021-02-15 ENCOUNTER — NON-APPOINTMENT (OUTPATIENT)
Age: 51
End: 2021-02-15

## 2021-02-15 ENCOUNTER — APPOINTMENT (OUTPATIENT)
Dept: CARDIOLOGY | Facility: CLINIC | Age: 51
End: 2021-02-15
Payer: MEDICAID

## 2021-02-15 VITALS
HEIGHT: 67 IN | WEIGHT: 183 LBS | BODY MASS INDEX: 28.72 KG/M2 | HEART RATE: 80 BPM | DIASTOLIC BLOOD PRESSURE: 78 MMHG | TEMPERATURE: 98 F | SYSTOLIC BLOOD PRESSURE: 118 MMHG | RESPIRATION RATE: 16 BRPM

## 2021-02-15 PROCEDURE — 99072 ADDL SUPL MATRL&STAF TM PHE: CPT

## 2021-02-15 PROCEDURE — 93000 ELECTROCARDIOGRAM COMPLETE: CPT

## 2021-02-15 PROCEDURE — 99214 OFFICE O/P EST MOD 30 MIN: CPT

## 2021-02-15 NOTE — PHYSICAL EXAM
[General Appearance - Well Developed] : well developed [General Appearance - Well Nourished] : well nourished [General Appearance - In No Acute Distress] : no acute distress [Normal Conjunctiva] : the conjunctiva exhibited no abnormalities [Normal Oral Mucosa] : normal oral mucosa [Normal Jugular Venous V Waves Present] : normal jugular venous V waves present [] : no respiratory distress [Respiration, Rhythm And Depth] : normal respiratory rhythm and effort [Auscultation Breath Sounds / Voice Sounds] : lungs were clear to auscultation bilaterally [Heart Rate And Rhythm] : heart rate and rhythm were normal [Heart Sounds] : normal S1 and S2 [Murmurs] : no murmurs present [Bowel Sounds] : normal bowel sounds [Abdomen Soft] : soft [Abdomen Tenderness] : non-tender [Abdomen Mass (___ Cm)] : no abdominal mass palpated [Abnormal Walk] : normal gait [Cyanosis, Localized] : no localized cyanosis [Nail Clubbing] : no clubbing of the fingernails [Oriented To Time, Place, And Person] : oriented to person, place, and time [Skin Color & Pigmentation] : normal skin color and pigmentation [FreeTextEntry1] : no edema

## 2021-02-15 NOTE — DISCUSSION/SUMMARY
[FreeTextEntry1] : Patient is a 49yo M with NICM, REMEDIOS here for cardiac follow up.  LV function has now progressed more as well with drop in EF, having exertional CP however nuclear stress test without ischemia and EF normal with stress. CP has improved some with nitrates, ? small vessel disease or related to CMP.  CAC = zero. \par -Echo 1/021 with mild improvement in LV function, EF 45-50%\par \par 1. Continue medical management of CMP, tolerating meds well. Trial of increasing entresto to 97/103mg bid\par 2. Continue nitrates for CP, possibly related to his CMP or small vessel disease. SEems to have helped\par 3. Chronic fatigue/dyspnea, unlikely cardiac given improvement in LV function \par 4. Follow up 6 months\par 5. No longer uses CPAP for REMEDIOS, advised start using again.

## 2021-02-15 NOTE — ASSESSMENT
[FreeTextEntry1] : ECG: SR, low voltage, artifact, NSST  (unchanged) \par \par CAC 3/2020: zero\par \par EXERCISE NUCLEAR STRESS TEST 2/2020:\par 1. Negative for ischemia\par 2. EF 56%\par 3. Developed anginal type chest pain with exertion that resolved with rest\par \par ECHO 1/2021:\par 1,Mild LV dysfunction and apical trabeculation, EF 45-50%\par 2. GRade I diastolic dysfunction\par 3. Mild LAE, normal RV\par 4. Mild TR\par \par ECHO 1/2020:\par 1. Mild LV dysfunction, EF 40-45%\par 2. Grade I diastolic dysfunction\par 3. Normal RV/LA/RA\par 4. No clinically significant valvular disease \par \par ECHO 1/2019:\par 1. Normal left ventricular internal cavity size.\par 2. Mildly decreased global left ventricular systolic function.\par 3. Left ventricular ejection fraction, by visual estimation, is 40 to 45%.\par 4. Impaired relaxation pattern of LV diastolic filling.\par 5. Mildly reduced RV systolic function.\par 6. The left atrium is normal in size and structure.\par 7. The right atrium is normal in size and structure.\par 8. Normal aortic valve, without any evidence of aortic stenosis or insufficiency.\par 9. Structurally normal mitral valve. No evidence of mitral stenosis or significant regurgitation.\par 10. Mild tricuspid regurgitation.\par 11. Intra-atrial septum is aneurysmal without evidence of intra-atrial shunting.\par 12. There is no evidence of pericardial effusion.\par 13. Recommend clinical correlation with the above findings.\par \par ECHO 5/2018:\par 1. Normal left ventricular internal cavity size.\par 2. Mildly decreased global left ventricular systolic function.\par 3. Left ventricular ejection fraction, by visual estimation, is 45 to 50%.\par 4. Impaired relaxation pattern of LV diastolic filling.\par 5. Normal right ventricular size and systolic function.\par 6. The left atrium is normal in size and structure.\par 7. Mildly dilated right atrium.\par 8. Normal aortic valve, without any evidence of aortic stenosis or insufficiency.\par 9. Mild thickening of the anterior and posterior mitral valve leaflets.\par 10. Mild tricuspid regurgitation.\par 11. Interatrial and interventricular septa appear intact.\par 12. A false tendon is seen in the LV apical cavity.\par 13. In comparison to prior studies there is no significant interval change.\par 14. Recommend clinical correlation with the above findings.\par \par CTA 2013: Calcium score = 0\par \par

## 2021-06-16 ENCOUNTER — RX RENEWAL (OUTPATIENT)
Age: 51
End: 2021-06-16

## 2021-09-08 ENCOUNTER — RX RENEWAL (OUTPATIENT)
Age: 51
End: 2021-09-08

## 2021-09-13 ENCOUNTER — APPOINTMENT (OUTPATIENT)
Dept: CARDIOLOGY | Facility: CLINIC | Age: 51
End: 2021-09-13

## 2022-01-04 ENCOUNTER — APPOINTMENT (OUTPATIENT)
Dept: CARDIOLOGY | Facility: CLINIC | Age: 52
End: 2022-01-04
Payer: MEDICAID

## 2022-01-04 ENCOUNTER — NON-APPOINTMENT (OUTPATIENT)
Age: 52
End: 2022-01-04

## 2022-01-04 VITALS
RESPIRATION RATE: 16 BRPM | WEIGHT: 186 LBS | DIASTOLIC BLOOD PRESSURE: 87 MMHG | BODY MASS INDEX: 29.19 KG/M2 | HEIGHT: 67 IN | HEART RATE: 91 BPM | SYSTOLIC BLOOD PRESSURE: 129 MMHG

## 2022-01-04 PROCEDURE — 93000 ELECTROCARDIOGRAM COMPLETE: CPT

## 2022-01-04 PROCEDURE — 99214 OFFICE O/P EST MOD 30 MIN: CPT

## 2022-01-04 RX ORDER — HYDROXYZINE HYDROCHLORIDE 25 MG/1
25 TABLET ORAL
Refills: 0 | Status: ACTIVE | COMMUNITY
Start: 2021-01-30

## 2022-01-04 RX ORDER — BUPROPION HYDROCHLORIDE 300 MG/1
300 TABLET, EXTENDED RELEASE ORAL DAILY
Refills: 0 | Status: ACTIVE | COMMUNITY

## 2022-01-04 RX ORDER — MULTIVITAMIN WITH FOLIC ACID 400 MCG
TABLET ORAL DAILY
Refills: 0 | Status: ACTIVE | COMMUNITY
Start: 2020-09-05

## 2022-01-04 RX ORDER — MIRTAZAPINE 45 MG/1
45 TABLET, FILM COATED ORAL
Refills: 0 | Status: ACTIVE | COMMUNITY

## 2022-01-04 RX ORDER — BUSPIRONE HYDROCHLORIDE 15 MG/1
15 TABLET ORAL DAILY
Refills: 0 | Status: ACTIVE | COMMUNITY

## 2022-01-04 NOTE — DISCUSSION/SUMMARY
[FreeTextEntry1] : Patient is a 52yo M with NICM, REMEDIOS here for cardiac follow up. \par -CP last year  improved some with nitrates, ? small vessel disease or related to CMP.  CAC = zero. \par -Echo 1/021 with mild improvement in LV function, EF 45-50%\par -Mild axis shift on ECG, ? lead placement \par \par 1. Continue medical management of CMP, tolerating meds well. \par 2. Continue nitrates for CP, possibly related to his CMP or small vessel disease. SEems to have helped\par 3. Chronic fatigue/dyspnea, unlikely cardiac given improvement in LV function \par 4. Repeat echo to evaluate CMP, call with results. Otherwise follow up 1 year \par 5. No longer uses CPAP for REMEDIOS, advised start using again\par 6. Reviewed BW, all stable. Lipids within acceptable range, given high HDL and zero calcium score no indication statin therapy.  HDL 86   \par

## 2022-01-04 NOTE — ASSESSMENT
[FreeTextEntry1] : ECG: SR, low voltage, artifact, NSST, LPFB \par \par CAC 3/2020: zero\par \par EXERCISE NUCLEAR STRESS TEST 2/2020:\par 1. Negative for ischemia\par 2. EF 56%\par 3. Developed anginal type chest pain with exertion that resolved with rest\par \par ECHO 1/2021:\par 1,Mild LV dysfunction and apical trabeculation, EF 45-50%\par 2. GRade I diastolic dysfunction\par 3. Mild LAE, normal RV\par 4. Mild TR\par \par ECHO 1/2020:\par 1. Mild LV dysfunction, EF 40-45%\par 2. Grade I diastolic dysfunction\par 3. Normal RV/LA/RA\par 4. No clinically significant valvular disease \par \par ECHO 1/2019:\par 1. Normal left ventricular internal cavity size.\par 2. Mildly decreased global left ventricular systolic function.\par 3. Left ventricular ejection fraction, by visual estimation, is 40 to 45%.\par 4. Impaired relaxation pattern of LV diastolic filling.\par 5. Mildly reduced RV systolic function.\par 6. The left atrium is normal in size and structure.\par 7. The right atrium is normal in size and structure.\par 8. Normal aortic valve, without any evidence of aortic stenosis or insufficiency.\par 9. Structurally normal mitral valve. No evidence of mitral stenosis or significant regurgitation.\par 10. Mild tricuspid regurgitation.\par 11. Intra-atrial septum is aneurysmal without evidence of intra-atrial shunting.\par 12. There is no evidence of pericardial effusion.\par 13. Recommend clinical correlation with the above findings.\par \par ECHO 5/2018:\par 1. Normal left ventricular internal cavity size.\par 2. Mildly decreased global left ventricular systolic function.\par 3. Left ventricular ejection fraction, by visual estimation, is 45 to 50%.\par 4. Impaired relaxation pattern of LV diastolic filling.\par 5. Normal right ventricular size and systolic function.\par 6. The left atrium is normal in size and structure.\par 7. Mildly dilated right atrium.\par 8. Normal aortic valve, without any evidence of aortic stenosis or insufficiency.\par 9. Mild thickening of the anterior and posterior mitral valve leaflets.\par 10. Mild tricuspid regurgitation.\par 11. Interatrial and interventricular septa appear intact.\par 12. A false tendon is seen in the LV apical cavity.\par 13. In comparison to prior studies there is no significant interval change.\par 14. Recommend clinical correlation with the above findings.\par \par CTA 2013: Calcium score = 0\par \par

## 2022-01-04 NOTE — HISTORY OF PRESENT ILLNESS
[FreeTextEntry1] : Patient is a 50yo M here for follow up of his NICM.. No pnd/orthopnea/palps/syncope.  Patient denies PND/orthopnea/edema/palpitations/syncope/claudication.  Was having chest pain beginning of the year and improved with nitrates. Nuclear stress testing negative and CAC done and zero. Some days better than others and will still get tired/fatigued. Also increased entresto at last OV. Got flu vaccine but not COVID vaccine. \par \par ROS: GI and  negative\par

## 2022-08-12 ENCOUNTER — APPOINTMENT (OUTPATIENT)
Dept: CARDIOLOGY | Facility: CLINIC | Age: 52
End: 2022-08-12

## 2022-08-12 PROCEDURE — 93306 TTE W/DOPPLER COMPLETE: CPT

## 2022-09-22 ENCOUNTER — NON-APPOINTMENT (OUTPATIENT)
Age: 52
End: 2022-09-22

## 2023-01-12 ENCOUNTER — NON-APPOINTMENT (OUTPATIENT)
Age: 53
End: 2023-01-12

## 2023-01-12 ENCOUNTER — APPOINTMENT (OUTPATIENT)
Dept: CARDIOLOGY | Facility: CLINIC | Age: 53
End: 2023-01-12
Payer: MEDICAID

## 2023-01-12 VITALS
HEIGHT: 67 IN | BODY MASS INDEX: 27.31 KG/M2 | HEART RATE: 84 BPM | RESPIRATION RATE: 16 BRPM | WEIGHT: 174 LBS | DIASTOLIC BLOOD PRESSURE: 68 MMHG | OXYGEN SATURATION: 96 % | SYSTOLIC BLOOD PRESSURE: 102 MMHG

## 2023-01-12 PROCEDURE — 93000 ELECTROCARDIOGRAM COMPLETE: CPT

## 2023-01-12 PROCEDURE — 99214 OFFICE O/P EST MOD 30 MIN: CPT | Mod: 25

## 2023-01-12 NOTE — HISTORY OF PRESENT ILLNESS
[FreeTextEntry1] : Patient is a 53yo M here for follow up of his NICM.  Patient denies PND/orthopnea/edema/palpitations/syncope/claudication. Intermittent chest pain that has improved on nitrates.  Nuclear stress testing negative and CAC done and zero. Still gets a bit tired but no change, also struggles with sleep. Walks daily without exertional CP, mild dyspnea on exertion but also unchanged (can walk several blocks). No real new complaints. \par \par Still lives with parents. \par \par ROS: GI and  negative\par

## 2023-01-12 NOTE — ASSESSMENT
[FreeTextEntry1] : ECG: SR, low voltage, artifact, NSST, \par \par CAC 3/2020: zero\par \par ECHO 8/2022:\par 1. Low normal LV function, EF 45-50%\par 2. Increased apical trabeculations, mostly lateral wall\par 3. Mild RVE with mild dysfunction\par 4. Normal LA/RA\par 5. Mild TR\par \par EXERCISE NUCLEAR STRESS TEST 2/2020:\par 1. Negative for ischemia\par 2. EF 56%\par 3. Developed anginal type chest pain with exertion that resolved with rest\par \par ECHO 1/2021:\par 1,Mild LV dysfunction and apical trabeculation, EF 45-50%\par 2. GRade I diastolic dysfunction\par 3. Mild LAE, normal RV\par 4. Mild TR\par \par ECHO 1/2020:\par 1. Mild LV dysfunction, EF 40-45%\par 2. Grade I diastolic dysfunction\par 3. Normal RV/LA/RA\par 4. No clinically significant valvular disease \par \par ECHO 1/2019:\par 1. Normal left ventricular internal cavity size.\par 2. Mildly decreased global left ventricular systolic function.\par 3. Left ventricular ejection fraction, by visual estimation, is 40 to 45%.\par 4. Impaired relaxation pattern of LV diastolic filling.\par 5. Mildly reduced RV systolic function.\par 6. The left atrium is normal in size and structure.\par 7. The right atrium is normal in size and structure.\par 8. Normal aortic valve, without any evidence of aortic stenosis or insufficiency.\par 9. Structurally normal mitral valve. No evidence of mitral stenosis or significant regurgitation.\par 10. Mild tricuspid regurgitation.\par 11. Intra-atrial septum is aneurysmal without evidence of intra-atrial shunting.\par 12. There is no evidence of pericardial effusion.\par 13. Recommend clinical correlation with the above findings.\par \par ECHO 5/2018:\par 1. Normal left ventricular internal cavity size.\par 2. Mildly decreased global left ventricular systolic function.\par 3. Left ventricular ejection fraction, by visual estimation, is 45 to 50%.\par 4. Impaired relaxation pattern of LV diastolic filling.\par 5. Normal right ventricular size and systolic function.\par 6. The left atrium is normal in size and structure.\par 7. Mildly dilated right atrium.\par 8. Normal aortic valve, without any evidence of aortic stenosis or insufficiency.\par 9. Mild thickening of the anterior and posterior mitral valve leaflets.\par 10. Mild tricuspid regurgitation.\par 11. Interatrial and interventricular septa appear intact.\par 12. A false tendon is seen in the LV apical cavity.\par 13. In comparison to prior studies there is no significant interval change.\par 14. Recommend clinical correlation with the above findings.\par \par CTA 2013: Calcium score = 0\par \par

## 2023-01-12 NOTE — DISCUSSION/SUMMARY
[FreeTextEntry1] : Patient is a 53yo M with NICM, REMEDIOS here for cardiac follow up. \par -CP  improved some with nitrates, ? small vessel disease or related to CMP.  CAC = zero. Negative nuclear stress in 2020\par -Echo 8/022 with stable improvement in LV function, EF 45-50%, mild RV dysfunction. Noted increased trabeculations but not non-compaction, will monitor long term\par \par \par 1. Continue medical management of CMP, tolerating meds well. \par 2. Continue nitrates for CP, possibly related to his CMP or small vessel disease. SEems to have helped\par 3. Chronic fatigue/dyspnea, unlikely cardiac given improvement in LV function. Continues to tolerate meds\par 4. Follow up 6 months with echo to re-evaluate LV function and apical trabeculations\par 5. No longer uses CPAP for REMEDIOS, advised start using again\par 6. Follow up PMD later this month\par \par  [EKG obtained to assist in diagnosis and management of assessed problem(s)] : EKG obtained to assist in diagnosis and management of assessed problem(s)

## 2023-01-12 NOTE — H&P PST ADULT - CONSTITUTIONAL
"  Katiana Hagan presented for a  Medicare AWV and comprehensive Health Risk Assessment today. The following components were reviewed and updated:    Medical history  Family History  Social history  Allergies and Current Medications  Health Risk Assessment  Health Maintenance  Care Team         ** See Completed Assessments for Annual Wellness Visit within the encounter summary.**         The following assessments were completed:  Living Situation  CAGE  Depression Screening  Timed Get Up and Go  Whisper Test  Cognitive Function Screening      Nutrition Screening  ADL Screening  PAQ Screening  OPIOID Screening: Patient does not have a prescription for narcotics. Patient does not use substance         Vitals:    01/12/23 1320 01/12/23 1331   BP:  136/70   BP Location:  Left arm   Pulse:  70   SpO2:  98%   Weight: 70.7 kg (155 lb 13.8 oz)    Height: 5' 6" (1.676 m)      Body mass index is 25.16 kg/m².  Physical Exam  Vitals and nursing note reviewed.   Constitutional:       Appearance: She is well-developed.   HENT:      Head: Normocephalic.   Cardiovascular:      Rate and Rhythm: Normal rate and regular rhythm.   Pulmonary:      Effort: Pulmonary effort is normal.      Breath sounds: Normal breath sounds.   Abdominal:      General: Bowel sounds are normal.      Palpations: Abdomen is soft.   Musculoskeletal:         General: Normal range of motion.   Skin:     General: Skin is warm and dry.   Neurological:      Mental Status: She is alert and oriented to person, place, and time.      Motor: No abnormal muscle tone.   Psychiatric:         Mood and Affect: Mood normal.             Diagnoses and health risks identified today and associated recommendations/orders:    1. Encounter for preventive health examination  Here for Health Risk Assessment/Annual Wellness Visit.  Health maintenance reviewed and updated. Follow up in one year.   Declined Shingrix, Covid booster today.    2. Hyperlipidemia, unspecified hyperlipidemia " type  Chronic, stable with diet.  Followed by PCP.     3. Venous insufficiency of both lower extremities  Chronic, stable. Followed by PCP.    4. Recurrent major depressive disorder, in remission  Chronic, reports she is no longer taking escitalopram. PHQ-2 score 1. Followed by PCP.    5. Anxiety  Chronic, reports she is no longer taking escitalopram. PHQ-2 score 1. Followed by PCP.    6. Vitamin D deficiency  Chronic, stable on current medication. Followed by PCP.     7. Post-menopausal  - DXA Bone Density Spine And Hip; Future      Provided Katiana with a 5-10 year written screening schedule and personal prevention plan. Recommendations were developed using the USPSTF age appropriate recommendations. Education, counseling, and referrals were provided as needed. After Visit Summary printed and given to patient which includes a list of additional screenings\tests needed.    Follow up in 7 weeks (on 2/27/2023).with PCP    Paayl Godfrey NP     detailed exam

## 2023-02-15 ENCOUNTER — NON-APPOINTMENT (OUTPATIENT)
Age: 53
End: 2023-02-15

## 2023-06-13 ENCOUNTER — APPOINTMENT (OUTPATIENT)
Dept: CARDIOLOGY | Facility: CLINIC | Age: 53
End: 2023-06-13
Payer: MEDICAID

## 2023-06-13 PROCEDURE — 93306 TTE W/DOPPLER COMPLETE: CPT

## 2023-06-20 ENCOUNTER — NON-APPOINTMENT (OUTPATIENT)
Age: 53
End: 2023-06-20

## 2023-06-20 ENCOUNTER — APPOINTMENT (OUTPATIENT)
Dept: CARDIOLOGY | Facility: CLINIC | Age: 53
End: 2023-06-20
Payer: MEDICAID

## 2023-06-20 VITALS
BODY MASS INDEX: 27.62 KG/M2 | RESPIRATION RATE: 16 BRPM | DIASTOLIC BLOOD PRESSURE: 81 MMHG | WEIGHT: 176 LBS | SYSTOLIC BLOOD PRESSURE: 117 MMHG | HEART RATE: 89 BPM | HEIGHT: 67 IN

## 2023-06-20 DIAGNOSIS — Z99.89 OBSTRUCTIVE SLEEP APNEA (ADULT) (PEDIATRIC): ICD-10-CM

## 2023-06-20 DIAGNOSIS — R06.09 OTHER FORMS OF DYSPNEA: ICD-10-CM

## 2023-06-20 DIAGNOSIS — G47.33 OBSTRUCTIVE SLEEP APNEA (ADULT) (PEDIATRIC): ICD-10-CM

## 2023-06-20 PROCEDURE — 93000 ELECTROCARDIOGRAM COMPLETE: CPT

## 2023-06-20 PROCEDURE — 99214 OFFICE O/P EST MOD 30 MIN: CPT | Mod: 25

## 2023-06-20 NOTE — HISTORY OF PRESENT ILLNESS
[FreeTextEntry1] : Patient is a 51yo M here for follow up of his NICM.  Patient denies PND/orthopnea/edema/palpitations/syncope/claudication. Intermittent chest pain that has improved on nitrates.  Nuclear stress testing negative and CAC done and zero. Still gets a bit tired but no change, also struggles with sleep. Walks daily without exertional CP (occasional intermittent non exertional CP), mild dyspnea on exertion but also unchanged (can walk several blocks). No real new complaints. Sometimes misses medications without major change in symptoms days this happens. \par \par Was in hospital 2/2023 with syncope due to taking double dose of his entresto/carvedilol after missing dose. \par \par Continues to live with parents. \par \par ROS: GI and  negative\par

## 2023-06-20 NOTE — ASSESSMENT
[FreeTextEntry1] : ECG: SR, low voltage, artifact, NSST, \par \par CAC 3/2020: zero\par \par ECHO 6/2023:\par 1. Mild LV dysfunction, EF 45-50%\par 2. Mild RVE, borderline function\par 3. Mild JUANA\par 4. Mild TR, RVSP 19mmHg\par \par ECHO 8/2022:\par 1. Low normal LV function, EF 45-50%\par 2. Increased apical trabeculations, mostly lateral wall\par 3. Mild RVE with mild dysfunction\par 4. Normal LA/RA\par 5. Mild TR\par \par EXERCISE NUCLEAR STRESS TEST 2/2020:\par 1. Negative for ischemia\par 2. EF 56%\par 3. Developed anginal type chest pain with exertion that resolved with rest\par \par ECHO 1/2021:\par 1,Mild LV dysfunction and apical trabeculation, EF 45-50%\par 2. GRade I diastolic dysfunction\par 3. Mild LAE, normal RV\par 4. Mild TR\par \par ECHO 1/2020:\par 1. Mild LV dysfunction, EF 40-45%\par 2. Grade I diastolic dysfunction\par 3. Normal RV/LA/RA\par 4. No clinically significant valvular disease \par \par ECHO 1/2019:\par 1. Normal left ventricular internal cavity size.\par 2. Mildly decreased global left ventricular systolic function.\par 3. Left ventricular ejection fraction, by visual estimation, is 40 to 45%.\par 4. Impaired relaxation pattern of LV diastolic filling.\par 5. Mildly reduced RV systolic function.\par 6. The left atrium is normal in size and structure.\par 7. The right atrium is normal in size and structure.\par 8. Normal aortic valve, without any evidence of aortic stenosis or insufficiency.\par 9. Structurally normal mitral valve. No evidence of mitral stenosis or significant regurgitation.\par 10. Mild tricuspid regurgitation.\par 11. Intra-atrial septum is aneurysmal without evidence of intra-atrial shunting.\par 12. There is no evidence of pericardial effusion.\par 13. Recommend clinical correlation with the above findings.\par \par ECHO 5/2018:\par 1. Normal left ventricular internal cavity size.\par 2. Mildly decreased global left ventricular systolic function.\par 3. Left ventricular ejection fraction, by visual estimation, is 45 to 50%.\par 4. Impaired relaxation pattern of LV diastolic filling.\par 5. Normal right ventricular size and systolic function.\par 6. The left atrium is normal in size and structure.\par 7. Mildly dilated right atrium.\par 8. Normal aortic valve, without any evidence of aortic stenosis or insufficiency.\par 9. Mild thickening of the anterior and posterior mitral valve leaflets.\par 10. Mild tricuspid regurgitation.\par 11. Interatrial and interventricular septa appear intact.\par 12. A false tendon is seen in the LV apical cavity.\par 13. In comparison to prior studies there is no significant interval change.\par 14. Recommend clinical correlation with the above findings.\par \par CTA 2013: Calcium score = 0\par \par

## 2023-06-20 NOTE — DISCUSSION/SUMMARY
[FreeTextEntry1] : Patient is a 53yo M with NICM, REMEDIOS here for cardiac follow up. \par -CP  improved some with nitrates, ? small vessel disease or related to CMP.  CAC = zero. Negative nuclear stress in 2020\par -Echo 6/023 with stable improvement in LV function, EF 45-50%, borderline RV dysfunction. Noted increased trabeculations in past but still without signs non-compaction, will monitor long term\par -Chronic fatigue non cardiac, overall thinks he feels better when takes meds then misses\par \par 1. Continue medical management of CMP, tolerating meds well. \par 2. Continue nitrates for CP, possibly related to his CMP or small vessel disease. SEems to have helped\par 3. Chronic fatigue/dyspnea, unlikely cardiac given improvement in LV function. Continues to tolerate meds\par 4. No longer uses CPAP for REMEDIOS, advised start using again but states it didn’t help\par 5. Follow up 6 months \par \par  [EKG obtained to assist in diagnosis and management of assessed problem(s)] : EKG obtained to assist in diagnosis and management of assessed problem(s)

## 2023-12-05 ENCOUNTER — NON-APPOINTMENT (OUTPATIENT)
Age: 53
End: 2023-12-05

## 2023-12-05 ENCOUNTER — APPOINTMENT (OUTPATIENT)
Dept: CARDIOLOGY | Facility: CLINIC | Age: 53
End: 2023-12-05
Payer: MEDICAID

## 2023-12-05 VITALS
DIASTOLIC BLOOD PRESSURE: 70 MMHG | HEART RATE: 78 BPM | RESPIRATION RATE: 16 BRPM | BODY MASS INDEX: 26.53 KG/M2 | WEIGHT: 169 LBS | HEIGHT: 67 IN | OXYGEN SATURATION: 98 % | SYSTOLIC BLOOD PRESSURE: 100 MMHG

## 2023-12-05 DIAGNOSIS — E78.5 HYPERLIPIDEMIA, UNSPECIFIED: ICD-10-CM

## 2023-12-05 PROCEDURE — 99214 OFFICE O/P EST MOD 30 MIN: CPT | Mod: 25

## 2023-12-05 PROCEDURE — 93000 ELECTROCARDIOGRAM COMPLETE: CPT

## 2024-06-07 ENCOUNTER — APPOINTMENT (OUTPATIENT)
Dept: CARDIOLOGY | Facility: CLINIC | Age: 54
End: 2024-06-07

## 2024-06-11 ENCOUNTER — APPOINTMENT (OUTPATIENT)
Dept: CARDIOLOGY | Facility: CLINIC | Age: 54
End: 2024-06-11
Payer: MEDICAID

## 2024-06-11 ENCOUNTER — NON-APPOINTMENT (OUTPATIENT)
Age: 54
End: 2024-06-11

## 2024-06-11 VITALS
WEIGHT: 163 LBS | HEART RATE: 84 BPM | HEIGHT: 67 IN | BODY MASS INDEX: 25.58 KG/M2 | RESPIRATION RATE: 16 BRPM | DIASTOLIC BLOOD PRESSURE: 70 MMHG | SYSTOLIC BLOOD PRESSURE: 112 MMHG | OXYGEN SATURATION: 96 %

## 2024-06-11 DIAGNOSIS — G47.33 OBSTRUCTIVE SLEEP APNEA (ADULT) (PEDIATRIC): ICD-10-CM

## 2024-06-11 DIAGNOSIS — I42.8 OTHER CARDIOMYOPATHIES: ICD-10-CM

## 2024-06-11 DIAGNOSIS — R53.83 OTHER FATIGUE: ICD-10-CM

## 2024-06-11 DIAGNOSIS — R07.9 CHEST PAIN, UNSPECIFIED: ICD-10-CM

## 2024-06-11 PROCEDURE — 99214 OFFICE O/P EST MOD 30 MIN: CPT

## 2024-06-11 PROCEDURE — G2211 COMPLEX E/M VISIT ADD ON: CPT | Mod: NC

## 2024-06-11 PROCEDURE — 93000 ELECTROCARDIOGRAM COMPLETE: CPT

## 2024-06-11 RX ORDER — ISOSORBIDE MONONITRATE 30 MG/1
30 TABLET, EXTENDED RELEASE ORAL DAILY
Qty: 90 | Refills: 2 | Status: ACTIVE | COMMUNITY
Start: 2020-02-21 | End: 1900-01-01

## 2024-06-11 RX ORDER — SACUBITRIL AND VALSARTAN 97; 103 MG/1; MG/1
97-103 TABLET, FILM COATED ORAL TWICE DAILY
Qty: 180 | Refills: 2 | Status: ACTIVE | COMMUNITY
Start: 2020-01-24 | End: 1900-01-01

## 2024-06-11 RX ORDER — CARVEDILOL 25 MG/1
25 TABLET, FILM COATED ORAL
Qty: 180 | Refills: 2 | Status: ACTIVE | COMMUNITY
Start: 2019-08-08 | End: 1900-01-01

## 2024-06-11 NOTE — HISTORY OF PRESENT ILLNESS
[FreeTextEntry1] : Patient is a 54yo M with REMEDIOS, chronic CP, NICM here for follow up. Still gets a bit tired but no change, also struggles with sleep. Walks daily without exertional CP (occasional intermittent non exertional CP), mild dyspnea on exertion but also unchanged (can walk several blocks still). No real new complaints. Patient denies PND/orthopnea/edema/palpitations/syncope/claudication    Continues to live with parents. Spends time with nephews. Has son who is 24.   ROS: GI and  negative

## 2024-06-11 NOTE — DISCUSSION/SUMMARY
[EKG obtained to assist in diagnosis and management of assessed problem(s)] : EKG obtained to assist in diagnosis and management of assessed problem(s) [FreeTextEntry1] : Patient is a 54yo M with NICM, REMEDIOS, chronic CP here for cardiac follow up.   CP -CP  improved some with nitrates, ? small vessel disease or related to CMP.  CAC = zero. Negative nuclear stress in 2020, low suspicion obstructive CAD   CMP: -Echo 6/023 with stable improvement in LV function, EF 45-50%, borderline RV dysfunction. Noted increased trabeculations in past but still without signs non-compaction, will monitor long term -Was in hospital 2/2023 with syncope after doubling dose of his entresto/carvedilol after missing dose.   FATIGUE -Chronic fatigue non cardiac, overall thinks he feels better when takes meds then misses   1. Continue medical management of CMP, tolerating meds well.  2. Continue nitrates for CP, possibly related to his CMP or small vessel disease. Seems to have helped 3. Chronic fatigue/dyspnea, unlikely cardiac given improvement in LV function. Continues to tolerate meds 4. No longer uses CPAP for REMEDIOS, advised start using again but states it didn't help 5. Needs echo to evaluate NICM and untreated REMEDIOS . If stable annual follow up  6. Regular PMD/psych/therapist follow up

## 2024-06-11 NOTE — ASSESSMENT
[FreeTextEntry1] : ECG: SR, low voltage, artifact, NSST, PRWP   CAC 3/2020: zero CT 2013: Calcium score = 0  ECHO 6/2023: 1. Mild LV dysfunction, EF 45-50% 2. Mild RVE, borderline function 3. Mild JUANA 4. Mild TR, RVSP 19mmHg   ECHO 8/2022: 1. Low normal LV function, EF 45-50% 2. Increased apical trabeculations, mostly lateral wall 3. Mild RVE with mild dysfunction 4. Normal LA/RA 5. Mild TR  EXERCISE NUCLEAR STRESS TEST 2/2020: 1. Negative for ischemia 2. EF 56% 3. Developed anginal type chest pain with exertion that resolved with rest  ECHO 1/2021: 1,Mild LV dysfunction and apical trabeculation, EF 45-50% 2. Grade I diastolic dysfunction 3. Mild LAE, normal RV 4. Mild TR  ECHO 1/2020: 1. Mild LV dysfunction, EF 40-45% 2. Grade I diastolic dysfunction 3. Normal RV/LA/RA 4. No clinically significant valvular disease  ECHO 1/2019: 1. Normal left ventricular internal cavity size. 2. Mildly decreased global left ventricular systolic function. 3. Left ventricular ejection fraction, by visual estimation, is 40 to 45%. 4. Impaired relaxation pattern of LV diastolic filling. 5. Mildly reduced RV systolic function. 6. Normal LA/RA 7. Normal aortic valve, without any evidence of aortic stenosis or insufficiency. 8. Structurally normal mitral valve. No evidence of mitral stenosis or significant regurgitation. 9. Mild tricuspid regurgitation. 10. Intra-atrial septum is aneurysmal without evidence of intra-atrial shunting.   ECHO 5/2018: 1. Normal left ventricular internal cavity size. 2. Mildly decreased global left ventricular systolic function. 3. Left ventricular ejection fraction, by visual estimation, is 45 to 50%. 4. Impaired relaxation pattern of LV diastolic filling. 5. Normal right ventricular size and systolic function. 6. The left atrium is normal in size and structure. 7. Mildly dilated right atrium. 8

## 2024-07-17 ENCOUNTER — APPOINTMENT (OUTPATIENT)
Dept: CARDIOLOGY | Facility: CLINIC | Age: 54
End: 2024-07-17
Payer: MEDICAID

## 2024-07-17 PROCEDURE — 93306 TTE W/DOPPLER COMPLETE: CPT

## 2024-08-20 ENCOUNTER — APPOINTMENT (OUTPATIENT)
Dept: CARDIOLOGY | Facility: CLINIC | Age: 54
End: 2024-08-20
Payer: MEDICAID

## 2024-08-20 PROCEDURE — 93308 TTE F-UP OR LMTD: CPT

## 2024-08-20 RX ORDER — PERFLUTREN 6.52 MG/ML
6.52 INJECTION, SUSPENSION INTRAVENOUS
Qty: 2 | Refills: 0 | Status: COMPLETED | OUTPATIENT
Start: 2024-08-20

## 2024-09-06 ENCOUNTER — NON-APPOINTMENT (OUTPATIENT)
Age: 54
End: 2024-09-06

## 2024-10-02 ENCOUNTER — NON-APPOINTMENT (OUTPATIENT)
Age: 54
End: 2024-10-02

## 2024-10-02 ENCOUNTER — APPOINTMENT (OUTPATIENT)
Dept: CARDIOLOGY | Facility: CLINIC | Age: 54
End: 2024-10-02
Payer: MEDICAID

## 2024-10-02 VITALS
HEART RATE: 103 BPM | WEIGHT: 172 LBS | RESPIRATION RATE: 16 BRPM | BODY MASS INDEX: 27 KG/M2 | DIASTOLIC BLOOD PRESSURE: 78 MMHG | HEIGHT: 67 IN | SYSTOLIC BLOOD PRESSURE: 106 MMHG

## 2024-10-02 DIAGNOSIS — G47.33 OBSTRUCTIVE SLEEP APNEA (ADULT) (PEDIATRIC): ICD-10-CM

## 2024-10-02 DIAGNOSIS — R07.9 CHEST PAIN, UNSPECIFIED: ICD-10-CM

## 2024-10-02 DIAGNOSIS — E78.5 HYPERLIPIDEMIA, UNSPECIFIED: ICD-10-CM

## 2024-10-02 DIAGNOSIS — I42.8 OTHER CARDIOMYOPATHIES: ICD-10-CM

## 2024-10-02 PROCEDURE — G2211 COMPLEX E/M VISIT ADD ON: CPT | Mod: NC

## 2024-10-02 PROCEDURE — 93000 ELECTROCARDIOGRAM COMPLETE: CPT

## 2024-10-02 PROCEDURE — 99214 OFFICE O/P EST MOD 30 MIN: CPT

## 2024-10-02 RX ORDER — DAPAGLIFLOZIN 10 MG/1
10 TABLET, FILM COATED ORAL DAILY
Qty: 90 | Refills: 2 | Status: ACTIVE | COMMUNITY
Start: 2024-10-02 | End: 1900-01-01

## 2024-10-02 NOTE — ASSESSMENT
[FreeTextEntry1] : ECG: ST, LAE, low voltage, artifact, NSST, PRWP   CAC 3/2020: zero CT 2013: Calcium score = 0  CARDIAC MRI 9/2024: 1. Findings c/w NICM, no thrombus or LGE  ECHO 8/2024: 1. Moderate LV dysfunction, EF 40-45% 2. Moderately increased trabeculations, suggestive LVNC with definity  3. RVE with normal function 4. Trivial MR, mild TR RVSP 23mmHg  ECHO 6/2023: 1. Mild LV dysfunction, EF 45-50% 2. Mild RVE, borderline function 3. Mild JUANA 4. Mild TR, RVSP 19mmHg   ECHO 8/2022: 1. Low normal LV function, EF 45-50% 2. Increased apical trabeculations, mostly lateral wall 3. Mild RVE with mild dysfunction 4. Normal LA/RA 5. Mild TR  EXERCISE NUCLEAR STRESS TEST 2/2020: 1. Negative for ischemia 2. EF 56% 3. Developed anginal type chest pain with exertion that resolved with rest  ECHO 1/2021: 1,Mild LV dysfunction and apical trabeculation, EF 45-50% 2. Grade I diastolic dysfunction 3. Mild LAE, normal RV 4. Mild TR  ECHO 1/2020: 1. Mild LV dysfunction, EF 40-45% 2. Grade I diastolic dysfunction 3. Normal RV/LA/RA 4. No clinically significant valvular disease  ECHO 1/2019: 1. Normal left ventricular internal cavity size. 2. Mildly decreased global left ventricular systolic function. 3. Left ventricular ejection fraction, by visual estimation, is 40 to 45%. 4. Impaired relaxation pattern of LV diastolic filling. 5. Mildly reduced RV systolic function. 6. Normal LA/RA 7. Normal aortic valve, without any evidence of aortic stenosis or insufficiency. 8. Structurally normal mitral valve. No evidence of mitral stenosis or significant regurgitation. 9. Mild tricuspid regurgitation. 10. Intra-atrial septum is aneurysmal without evidence of intra-atrial shunting.   ECHO 5/2018: 1. Normal left ventricular internal cavity size. 2. Mildly decreased global left ventricular systolic function. 3. Left ventricular ejection fraction, by visual estimation, is 45 to 50%. 4. Impaired relaxation pattern of LV diastolic filling. 5. Normal right ventricular size and systolic function. 6. The left atrium is normal in size and structure. 7. Mildly dilated right atrium. 8

## 2024-10-02 NOTE — DISCUSSION/SUMMARY
[EKG obtained to assist in diagnosis and management of assessed problem(s)] : EKG obtained to assist in diagnosis and management of assessed problem(s) [FreeTextEntry1] : Patient is a 54yo M with NICM, REMEDIOS  not on CPAP (unable to tolerate), chronic CP here for cardiac follow up.   CP -CP  improved some with nitrates, ? small vessel disease or related to CMP.  CAC = zero. Negative nuclear stress in 2020, low suspicion obstructive CAD   CMP: -Echo 6/023 with stable improvement in LV function, EF 45-50%, borderline RV dysfunction. Noted increased trabeculations in past but still without signs non-compaction, will monitor long term -Was in hospital 2/2023 with syncope after doubling dose of his entresto/carvedilol after missing dose.  -More recent echo summer 0224 with drop in EF, increased trabeculations but not clear LVNC, cardiac MRI without LGE and stanley contact radiologist as no comment about LVNC. Continue med management, no congestion or change in symptoms. will add SGLT2 inhibitor, if needed can cut back nitrates as used for CP only   FATIGUE -Chronic fatigue non cardiac, overall thinks he feels better when takes meds then misses. Unable to tolerated CPAP   1. Continue medical management of CMP, add Farxiga 10mg daily and check BMP"/Mg 2 weeks.  2. Genetic testing still pending  3. Continue nitrates for CP, possibly related to his CMP or small vessel disease. Seems to have helped, if cannot tolerate along with SGLT 2 inhibitor will dc  3. Chronic fatigue/dyspnea, unlikely cardiac  4.Regular PMD/psych/therapist follow up  5. Follow up 3 months

## 2024-10-02 NOTE — HISTORY OF PRESENT ILLNESS
[FreeTextEntry1] : Patient is a 54yo M with REMEDIOS not on CPAP, chronic CP, NICM here for follow up. Still gets a bit tired but no change, also struggles with sleep. Walks daily without exertional CP (occasional intermittent non exertional CP), mild dyspnea on exertion but also unchanged (can walk several blocks still). No real new complaints. Patient denies PND/orthopnea/edema/palpitations/syncope/claudication  Patient underwent cardiac testing after last visit.   Continues to live with parents. Spends time with nephews. Has son who is 24.   ROS: GI and  negative

## 2024-11-01 RX ORDER — DAPAGLIFLOZIN 10 MG/1
10 TABLET, FILM COATED ORAL DAILY
Qty: 90 | Refills: 3 | Status: ACTIVE | COMMUNITY
Start: 2024-11-01 | End: 1900-01-01

## 2025-03-03 ENCOUNTER — NON-APPOINTMENT (OUTPATIENT)
Age: 55
End: 2025-03-03

## 2025-03-03 ENCOUNTER — APPOINTMENT (OUTPATIENT)
Dept: CARDIOLOGY | Facility: CLINIC | Age: 55
End: 2025-03-03
Payer: MEDICAID

## 2025-03-03 VITALS
DIASTOLIC BLOOD PRESSURE: 78 MMHG | HEART RATE: 75 BPM | BODY MASS INDEX: 25.61 KG/M2 | HEIGHT: 68 IN | WEIGHT: 169 LBS | SYSTOLIC BLOOD PRESSURE: 110 MMHG

## 2025-03-03 DIAGNOSIS — I42.8 OTHER CARDIOMYOPATHIES: ICD-10-CM

## 2025-03-03 DIAGNOSIS — G47.33 OBSTRUCTIVE SLEEP APNEA (ADULT) (PEDIATRIC): ICD-10-CM

## 2025-03-03 DIAGNOSIS — E78.5 HYPERLIPIDEMIA, UNSPECIFIED: ICD-10-CM

## 2025-03-03 PROCEDURE — 99214 OFFICE O/P EST MOD 30 MIN: CPT | Mod: 25

## 2025-03-03 PROCEDURE — 93000 ELECTROCARDIOGRAM COMPLETE: CPT

## 2025-04-21 ENCOUNTER — APPOINTMENT (OUTPATIENT)
Dept: CARDIOLOGY | Facility: CLINIC | Age: 55
End: 2025-04-21
Payer: MEDICAID

## 2025-04-21 PROCEDURE — 93308 TTE F-UP OR LMTD: CPT

## 2025-04-21 RX ORDER — PERFLUTREN 6.52 MG/ML
6.52 INJECTION, SUSPENSION INTRAVENOUS
Qty: 1 | Refills: 0 | Status: COMPLETED | OUTPATIENT
Start: 2025-04-21

## 2025-05-12 ENCOUNTER — NON-APPOINTMENT (OUTPATIENT)
Age: 55
End: 2025-05-12

## 2025-05-12 ENCOUNTER — APPOINTMENT (OUTPATIENT)
Dept: CARDIOLOGY | Facility: CLINIC | Age: 55
End: 2025-05-12
Payer: MEDICAID

## 2025-05-12 VITALS
BODY MASS INDEX: 24.63 KG/M2 | SYSTOLIC BLOOD PRESSURE: 102 MMHG | HEART RATE: 97 BPM | RESPIRATION RATE: 13 BRPM | WEIGHT: 162 LBS | DIASTOLIC BLOOD PRESSURE: 78 MMHG

## 2025-05-12 DIAGNOSIS — I42.8 OTHER CARDIOMYOPATHIES: ICD-10-CM

## 2025-05-12 DIAGNOSIS — E78.5 HYPERLIPIDEMIA, UNSPECIFIED: ICD-10-CM

## 2025-05-12 DIAGNOSIS — G47.33 OBSTRUCTIVE SLEEP APNEA (ADULT) (PEDIATRIC): ICD-10-CM

## 2025-05-12 PROCEDURE — 93000 ELECTROCARDIOGRAM COMPLETE: CPT

## 2025-05-12 PROCEDURE — 99214 OFFICE O/P EST MOD 30 MIN: CPT | Mod: 25
